# Patient Record
Sex: MALE | Race: WHITE | Employment: FULL TIME | URBAN - METROPOLITAN AREA
[De-identification: names, ages, dates, MRNs, and addresses within clinical notes are randomized per-mention and may not be internally consistent; named-entity substitution may affect disease eponyms.]

---

## 2020-02-04 ENCOUNTER — APPOINTMENT (OUTPATIENT)
Dept: CT IMAGING | Age: 20
End: 2020-02-04

## 2020-02-04 ENCOUNTER — APPOINTMENT (OUTPATIENT)
Dept: GENERAL RADIOLOGY | Age: 20
End: 2020-02-04

## 2020-02-04 ENCOUNTER — HOSPITAL ENCOUNTER (EMERGENCY)
Age: 20
Discharge: HOME | End: 2020-02-04
Attending: EMERGENCY MEDICINE

## 2020-02-04 VITALS
WEIGHT: 130 LBS | HEIGHT: 71 IN | TEMPERATURE: 98.2 F | DIASTOLIC BLOOD PRESSURE: 64 MMHG | OXYGEN SATURATION: 100 % | RESPIRATION RATE: 16 BRPM | BODY MASS INDEX: 18.2 KG/M2 | SYSTOLIC BLOOD PRESSURE: 113 MMHG | HEART RATE: 104 BPM

## 2020-02-04 PROCEDURE — 6370000000 HC RX 637 (ALT 250 FOR IP): Performed by: EMERGENCY MEDICINE

## 2020-02-04 PROCEDURE — 71046 X-RAY EXAM CHEST 2 VIEWS: CPT

## 2020-02-04 PROCEDURE — 72192 CT PELVIS W/O DYE: CPT

## 2020-02-04 PROCEDURE — 73080 X-RAY EXAM OF ELBOW: CPT

## 2020-02-04 PROCEDURE — 99284 EMERGENCY DEPT VISIT MOD MDM: CPT

## 2020-02-04 PROCEDURE — 72125 CT NECK SPINE W/O DYE: CPT

## 2020-02-04 RX ORDER — NAPROXEN 250 MG/1
500 TABLET ORAL ONCE
Status: COMPLETED | OUTPATIENT
Start: 2020-02-04 | End: 2020-02-04

## 2020-02-04 RX ADMIN — Medication 500 MG: at 22:56

## 2020-02-04 ASSESSMENT — PAIN SCALES - GENERAL
PAINLEVEL_OUTOF10: 4
PAINLEVEL_OUTOF10: 10

## 2020-02-04 ASSESSMENT — PAIN DESCRIPTION - PAIN TYPE: TYPE: ACUTE PAIN

## 2020-02-04 ASSESSMENT — PAIN DESCRIPTION - LOCATION: LOCATION: BUTTOCKS;ELBOW

## 2020-02-05 NOTE — ED NOTES
Patient able to ambulate around perimeter of zone without difficulty. Patient slow and steady gait noted. Dr. Chantell Qiu notified.       Madhu New RN  02/04/20 0972

## 2020-02-05 NOTE — ED PROVIDER NOTES
Emergency Department Encounter  Location: [address] [place] [address]    Patient: Ivanna Montoya  MRN: [id number]  : 2000  Date of evaluation: 2020  ED Provider: Abhishek Sanon DO    Chief Complaint:    Leg Injury; Rib Pain; and Elbow Pain    [place]:  Ivanna Montoya is a 23 y.o. male that presents to the emergency department for reported fall. Patient describes that he was jumping over a wall. He thought it was level ground on the other side, but it was actually concrete stairs. He fell, approximately 20 feet, landing on his left hip. He states he then struck his right elbow and scraped his ribs. He did not hit his head or lose consciousness. Denies neck or back pain. He actually states his chest does not hurt and is not short of breath. No abdominal pain. He was able to get up and get back to his car but states he was hopping on his right leg. No weakness or paresthesias in extremities. He was in his usual state of health prior to the fall. Admits he had one beer earlier this evening. Imm are up-to-date. ROS:  At least 10 systems reviewed and otherwise acutely negative except as in the [address] No past medical history on file. No past surgical history on file. No family history on file. Social History     Socioeconomic History    Marital status: Single     Spouse name: Not on file    Number of children: Not on file    Years of education: Not on file    Highest education level: Not on file   Occupational History    Not on file   Social Needs    Financial resource strain: Not on file    Food insecurity:     Worry: Not on file     Inability: Not on file    Transportation needs:     Medical: Not on file     Non-medical: Not on file   Tobacco Use    Smoking status: Not on file   Substance and Sexual Activity    Alcohol use:  Yes    Drug use: Not on file    Sexual activity: Not on file   Lifestyle    Physical activity:     Days per week: Not on right elbow is mildly edematous with ecchymosis and tenderness over the olecranon process. Full range of motion in the elbow. Wrist, forearm, upper arm and shoulder are nontender. No clavicular tenderness. No other areas of bony tenderness or deformity. SKIN: Warm and dry. NEUROLOGICAL: Patient is alert and oriented with clear speech and mentation. CN 2-12 are grossly intact. Symmetric motor strength and intact sensation in all extremities. No dysmetria or neglect. Symmetric patellar DTR's. PSYCHIATRIC: Normal mood. Labs:  No results found for this visit on 02/04/20. Radiographs (if obtained):  [] The following radiograph was interpreted by myself in the absence of a radiologist:  [x] Radiologist's Report reviewed at time of ED visit:  Xr Chest Standard (2 Vw)    Result Date: 2/4/2020  EXAMINATION: TWO XRAY VIEWS OF THE CHEST 2/4/2020 9:34 pm COMPARISON: None. HISTORY: ORDERING SYSTEM PROVIDED HISTORY: Chest pain TECHNOLOGIST PROVIDED HISTORY: Reason for exam:->Chest pain Reason for Exam: pain Acuity: Acute Type of Exam: Initial Mechanism of Injury: fall Relevant Medical/Surgical History: none FINDINGS: The cardiomediastinal and hilar silhouettes appear unremarkable. High density calcified nodule lateral mid right lung measures 6 mm typical of sequela from old granulomatous disease. Right lung appears clear otherwise. The left lung appears clear. No pleural effusion evident. No pneumothorax is seen. No acute osseous abnormality is identified. No radiographic evidence of acute cardiopulmonary disease.      Xr Elbow Right (min 3 Views)    Result Date: 2/4/2020  EXAMINATION: THREE XRAY VIEWS OF THE RIGHT ELBOW 2/4/2020 9:34 pm COMPARISON: None HISTORY: ORDERING SYSTEM PROVIDED HISTORY: trauma TECHNOLOGIST PROVIDED HISTORY: Reason for exam:->trauma Reason for Exam: pain Acuity: Acute Type of Exam: Initial Mechanism of Injury: fall Relevant Medical/Surgical History: fx, sx FINDINGS: No evidence of acute fracture or dislocation of the elbow. 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 There is lucency about the distal aspect of both screws which can be seen in the setting of loosening. No evidence of hardware malalignment or fracture. Mild medial soft tissue swelling. 1. No acute bony abnormality of the elbow. 2. Status post ORIF of a medial condyle with lucency about the distal aspects of both screws. This finding can be seen in the setting of loosening. Ct Cervical Spine Wo Contrast    Result Date: 2/4/2020  EXAMINATION: CT OF THE CERVICAL SPINE WITHOUT CONTRAST 2/4/2020 10:11 pm TECHNIQUE: CT of the cervical spine was performed without the administration of intravenous contrast. Multiplanar reformatted images are provided for review. Dose modulation, iterative reconstruction, and/or weight based adjustment of the mA/kV was utilized to reduce the radiation dose to as low as reasonably achievable. COMPARISON: None. HISTORY: ORDERING SYSTEM PROVIDED HISTORY: trauma TECHNOLOGIST PROVIDED HISTORY: Reason for exam:->trauma Reason for Exam: fall, cervical,pelvic pain. Acuity: Acute Type of Exam: Initial Mechanism of Injury: fall Relevant Medical/Surgical History: none FINDINGS: BONES/ALIGNMENT: There is no acute fracture or traumatic malalignment. DEGENERATIVE CHANGES: No significant degenerative changes. SOFT TISSUES: There is no prevertebral soft tissue swelling. Fibrotic changes are seen within the lung apices bilaterally. No acute abnormality of the cervical spine. Ct Pelvis Wo Contrast Additional Contrast? None    Result Date: 2/4/2020  EXAMINATION: CT OF THE PELVIS WITHOUT CONTRAST 2/4/2020 10:13 pm TECHNIQUE: CT of the pelvis was performed without the administration of intravenous contrast.  Multiplanar reformatted images are provided for review.  Dose modulation, iterative reconstruction, and/or weight based adjustment of the mA/kV was utilized to reduce the radiation dose to as low as reasonably achievable. COMPARISON: None. HISTORY: ORDERING SYSTEM PROVIDED HISTORY: trauma TECHNOLOGIST PROVIDED HISTORY: Reason for exam:->trauma Additional Contrast?->None Reason for Exam: fall, cervical, pelvic pain. Acuity: Acute Type of Exam: Initial Mechanism of Injury: fall Relevant Medical/Surgical History: none FINDINGS: No fracture or hip dislocation is identified. No significant soft tissue swelling is detected. Joint spaces are preserved. No aggressive appearing osseous lesions are visualized. No fracture detected. ED Course and MDM:  Patient's imaging is reviewed and is reassuring. Without evidence of acute fracture. On exam, he is quite comfortable and able to ambulate. Hemodynamically stable and neurologically intact. I think patient is appropriate for outpatient management. Patient is given instructions regarding symptomatic care at home as well as return precautions. To follow up with PCP for recheck in 2-3 days. Patient verbalizes understanding of all instructions and is comfortable with the plan of care. Final Impression:  1. Contusion of left hip, initial encounter    2. Abrasion of right elbow, initial encounter    3. Fall on stairs, initial encounter      DISPOSITION Decision To Discharge 02/04/2020 11:16:10 PM      Patient referred to:  [facility] [address] [facility] [phone]  Schedule an appointment as soon as possible for a visit in 2 days      [facility] Emergency Department  [place] [facility] [address]  [phone]    If symptoms worsen    Discharge medications: There are no discharge medications for this patient.     (Please note that portions of this note may have been completed with a voice recognition program. Efforts were made to edit the dictations but occasionally words are

## 2020-02-05 NOTE — ED NOTES
XR CHEST STANDARD (2 VW)   Status: Final result   Order Providers     Authorizing Billing   DO Ibrahima Waldron MD          Signed by     Signed Date/Time  Phone Pager   Misa Nelson 2/04/2020 21:53 [phone]    Reading Providers     Read Date Phone Pager   Misa Nelson Feb 4, 2020 [phone]    Radiation Dose Estimates     No radiation information found for this patient   Narrative   EXAMINATION:   TWO XRAY VIEWS OF THE CHEST       2/4/2020 9:34 pm       COMPARISON:   None.       HISTORY:   ORDERING SYSTEM PROVIDED HISTORY: Chest pain   TECHNOLOGIST PROVIDED HISTORY:   Reason for exam:->Chest pain   Reason for Exam: pain   Acuity: Acute   Type of Exam: Initial   Mechanism of Injury: fall   Relevant Medical/Surgical History: none       FINDINGS:   The cardiomediastinal and hilar silhouettes appear unremarkable.  High   density calcified nodule lateral mid right lung measures 6 mm typical of   sequela from old granulomatous disease.  Right lung appears clear otherwise. The left lung appears clear. No pleural effusion evident. No pneumothorax is   seen. No acute osseous abnormality is identified.           Impression   No radiographic evidence of acute cardiopulmonary disease.           Asia Ewing RN  02/04/20 2228

## 2020-02-05 NOTE — ED NOTES
Patient presents to ED with complaints of left leg injury, right rib pain and elbow pain, patient reports that he fell \"30 feet\" from a wall.  Denies hitting head or LOC        Maritza Snider RN  02/04/20 [id number]

## 2020-02-05 NOTE — ED NOTES
XR ELBOW RIGHT (MIN 3 VIEWS)   Status: Preliminary result   Order Providers     Aman Holder,           Reading Providers     Read Date Phone Pager   Joi Aranda Feb 4, 2020 [phone]    Radiation Dose Estimates     No radiation information found for this patient   Narrative   EXAMINATION:   THREE XRAY VIEWS OF THE RIGHT ELBOW       2/4/2020 9:34 pm       COMPARISON:   None       HISTORY:   ORDERING SYSTEM PROVIDED HISTORY: trauma   TECHNOLOGIST PROVIDED HISTORY:   Reason for exam:->trauma   Reason for Exam: pain   Acuity: Acute   Type of Exam: Initial   Mechanism of Injury: fall   Relevant Medical/Surgical History: fx, sx       FINDINGS:   No evidence of acute fracture or dislocation of the elbow.  There is chronic   nonunion of the medial epicondyle status post ORIF with 2 threaded anchoring   screws.  There is lucency about the distal aspect of both screws which can be   seen in the setting of loosening.  No evidence of hardware malalignment or   fracture.  Mild medial soft tissue swelling.           Impression   1. No acute bony abnormality of the elbow. 2. Status post ORIF of a medial condyle with lucency about the distal aspects   of both screws.  This finding can be seen in the setting of loosening.           Albert Ford RN  02/04/20 [id number]